# Patient Record
Sex: MALE | Race: WHITE
[De-identification: names, ages, dates, MRNs, and addresses within clinical notes are randomized per-mention and may not be internally consistent; named-entity substitution may affect disease eponyms.]

---

## 2021-05-01 ENCOUNTER — HOSPITAL ENCOUNTER (EMERGENCY)
Dept: HOSPITAL 63 - ER | Age: 56
Discharge: HOME | End: 2021-05-01
Payer: COMMERCIAL

## 2021-05-01 VITALS — DIASTOLIC BLOOD PRESSURE: 79 MMHG | SYSTOLIC BLOOD PRESSURE: 140 MMHG

## 2021-05-01 VITALS — HEIGHT: 71 IN | WEIGHT: 245.37 LBS | BODY MASS INDEX: 34.35 KG/M2

## 2021-05-01 DIAGNOSIS — I10: ICD-10-CM

## 2021-05-01 DIAGNOSIS — E78.5: ICD-10-CM
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
## 2021-05-01 DIAGNOSIS — S81.812A: Primary | ICD-10-CM
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
## 2021-05-01 PROCEDURE — 12004 RPR S/N/AX/GEN/TRK7.6-12.5CM: CPT

## 2021-05-01 PROCEDURE — 90471 IMMUNIZATION ADMIN: CPT

## 2021-05-01 PROCEDURE — 90715 TDAP VACCINE 7 YRS/> IM: CPT

## 2021-05-01 NOTE — PHYS DOC
Past History


Past Medical History:  Cancer, High Cholesterol, Hypertension


Past Surgical History:  Cancer Surgery, Coronary Bypass Surgery


Additional Past Surgical Histo:  RIGHT KIDNEY SECONDARY TO CA, TRIPLE BYPASS


Alcohol Use:  Occasionally





General Pediatric Assessment


History of Present Illness





Patient is a 55-year-old male patient presented to the ED today with left shin 

laceration that occurred after he fell down patient cutting himself on a tree 

stump.





Historian was the  patient


Review of Systems





Constitutional: Denies fever or chills []





Musculoskeletal: Denies back pain or joint pain []


Integument: Reports left shin laceration


Neurologic: Denies headache, focal weakness or sensory changes []








All other systems were reviewed and found to be within normal limits, except as 

documented in this note.


Current Medications





Current Medications








 Medications


  (Trade)  Dose


 Ordered  Sig/Trudy  Start Time


 Stop Time Status Last Admin


Dose Admin


 


 Diphtheria/


 Pertussis/Tetanus


 Vacc


  (ADACEL TDap


 SYRINGE)  0.5 ml  ONCE ONCE  5/1/21 15:00


 5/1/21 15:05 DC 5/1/21 15:24


0.5 ML


 


 Lidocaine/


 Epinephrine


  (Xylocaine


 1%-Epi 1:100,000)  20 ml  STK-MED ONCE  5/1/21 14:50


 5/1/21 14:50 DC  











Allergies





Allergies








Coded Allergies Type Severity Reaction Last Updated Verified


 


  No Known Drug Allergies    5/1/21 No








Physical Exam





Constitutional: Well developed, well nourished, no acute distress, n


Skin: Left shin with a laceration approximately 8 cm long, there is no tendon 

involvement.  Full range of motion to the left lower extremity including foot 

knee and ankle.  +2 left pedal pulse.  Cap refill less than 2 seconds to left 

toes


Back: No tenderness, no CVA tenderness.


Extremeties: Intact distal pulses, no tenderness, no cyanosis, no clubbing, ROM 

intact, no edema. 


Musculoskeletal: Good ROM in all major joints, no tenderness to palpation or 

major deformities noted. 


Neurologic: Alert and oriented X 3, normal motor function, normal sensory 

function, no focal deficits noted.


Psychologic: Affect normal, judgement normal, mood normal.


Radiology/Procedures


Laceration/Wound Repair





Laceration/Wound Repair :  []


   Wound Location: Left shin


   Wound's Depth, Shape: Vertical


   Wound Length (cm): Approximately 8 cm


   Wound Explored:  clean


   Irrigated w/ Saline (ccs): 1000


   Betadine Prep?:  Yes


   Anesthesia: 20 mm of lidocaine with epinephrine


   Wound Repaired With: 14 staples





Progress wound was covered with nonstick dressing


Current Patient Data





Vital Signs








  Date Time  Temp Pulse Resp B/P (MAP) Pulse Ox O2 Delivery O2 Flow Rate FiO2


 


5/1/21 14:45 98.3 86 16 141/83 (102) 96 Room Air  








Vital Signs








  Date Time  Temp Pulse Resp B/P (MAP) Pulse Ox O2 Delivery O2 Flow Rate FiO2


 


5/1/21 14:45 98.3 86 16 141/83 (102) 96 Room Air  








Vital Signs








  Date Time  Temp Pulse Resp B/P (MAP) Pulse Ox O2 Delivery O2 Flow Rate FiO2


 


5/1/21 14:45 98.3 86 16 141/83 (102) 96 Room Air  








Course & Med Decision Making


Pertinent Labs and Imaging studies reviewed. (See chart for details)





This is a 55-year-old male patient presenting to the ED today with left shin 

laceration that was closed by me as noted in procedures.  Wound care 

instructions and return precautions provided.  Tetanus updated





Departure


Departure:


Impression:  


   Primary Impression:  


   Laceration of left lower leg


Disposition:  01 HOME / SELF CARE / HOMELESS


Condition:  STABLE


Referrals:  


PCP,NO (PCP)


please follow up with the Ed or your doctor in 7-10 days for staple removal


Patient Instructions:  Laceration Care, Adult, Easy-to-Read





Additional Instructions:  


You have a laceration on the left shin that was closed with staples.  Please 

remove the dressing in 24 hours.  Please wash the area once a day with regular 

soap and water.  Keep the area open to air if not bleeding or draining.  Monitor

 the area for any signs of infection including but not limited to increased 

redness, warmth, yellow drainage from the area and return to the ED see your 

doctor for your cough.  Please follow-up with your own doctor or the emergency 

room in 7 to 10 days for staple removal





Problem Qualifiers








   Primary Impression:  


   Laceration of left lower leg


   Encounter type:  initial encounter  Qualified Codes:  S81.812A - Laceration 

   without foreign body, left lower leg, initial encounter








RACHID CONNELL APRN             May 1, 2021 16:31